# Patient Record
(demographics unavailable — no encounter records)

---

## 2024-10-09 NOTE — DATA REVIEWED
[FreeTextEntry1] : X-ray lumbar spine from R Mild to moderate lumbar scoliosis, convex to the left.  Multilevel degenerative disc disease as noted above.

## 2024-10-09 NOTE — PHYSICAL EXAM
[de-identified] : General: Appears well nourished and well developed, no acute distress Musculoskeletal: Gait is non-antalgic, patient is ambulating without assistance Lumbar Spine Exam:                         Inspection:    erythema (-)   ecchymosis (-)   rashes (-)                           Palpation: Palpation/percussion at the midline lumbar levels reveals no tenderness                        Strength Testin/5 in the bilateral lower extremities                      Reflexes: hypoactive in the bilateral lower extremities                      Sensation: Sensation to light touch grossly intact in the bilateral lower extremities except the bilateral feet                      Special Tests: None

## 2024-10-09 NOTE — PHYSICAL EXAM
[de-identified] : General: Appears well nourished and well developed, no acute distress Musculoskeletal: Gait is non-antalgic, patient is ambulating without assistance Lumbar Spine Exam:                         Inspection:    erythema (-)   ecchymosis (-)   rashes (-)                           Palpation: Palpation/percussion at the midline lumbar levels reveals no tenderness                        Strength Testin/5 in the bilateral lower extremities                      Reflexes: hypoactive in the bilateral lower extremities                      Sensation: Sensation to light touch grossly intact in the bilateral lower extremities except the bilateral feet                      Special Tests: None

## 2024-10-09 NOTE — DISCUSSION/SUMMARY
[de-identified] : ADAMA THACKER is a 72 year-old man presenting for a NPV for a history of

## 2024-10-09 NOTE — HISTORY OF PRESENT ILLNESS
[Lower back] : lower back [5] : 5 [Tingling] : tingling [Constant] : constant [Household chores] : household chores [Sleep] : sleep [Leisure] : leisure [Meds] : meds [] : no [FreeTextEntry1] : b/l feet  [FreeTextEntry6] : numbness and tingling in b/l feet  [FreeTextEntry9] : Meloxicam for knee pain  [de-identified] : lifting  [de-identified] : x-ray and MRI

## 2024-10-09 NOTE — ASSESSMENT
[FreeTextEntry1] : Patient presents predominantly with numbness/tingling in the bilateral feet, no pain, no radicular pain, no lower back pain.   A discussion regarding available pain management treatment options occurred with the patient.  These included  interventional, rehabilitative, pharmacological, and alternative modalities. We will proceed with the following:     Interventional treatment options:            - None indicated at present time              - see additional instructions below        Medication based treatment options:            - initiate trial of Gabapentin 300mg qhs. Rx sent today.  Additional treatment recommendations as follows:            - patient with follow up with neurologist, EMG ordered.          -  The risks, benefits and alternatives of medication were discussed with patient. Patient expressed understanding and would like to proceed.

## 2024-10-09 NOTE — HISTORY OF PRESENT ILLNESS
[Lower back] : lower back [5] : 5 [Tingling] : tingling [Constant] : constant [Household chores] : household chores [Sleep] : sleep [Leisure] : leisure [Meds] : meds [] : no [FreeTextEntry1] : b/l feet  [FreeTextEntry6] : numbness and tingling in b/l feet  [FreeTextEntry9] : Meloxicam for knee pain  [de-identified] : lifting  [de-identified] : x-ray and MRI

## 2024-10-09 NOTE — DISCUSSION/SUMMARY
[de-identified] : ADAMA THACKER is a 72 year-old man presenting for a NPV for a history of

## 2025-03-28 NOTE — IMAGING
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

## 2025-03-28 NOTE — DISCUSSION/SUMMARY
[de-identified] : I, Melly Perry, am scribing for Dr. Erazo in his presence for the chief complaint, physical exam, studies, assessment, and/or plan.

## 2025-03-28 NOTE — PHYSICAL EXAM
[Right] : right knee [] : no erythema [TWNoteComboBox7] : flexion 130 degrees [de-identified] : extension 0 degrees

## 2025-03-28 NOTE — ASSESSMENT
[FreeTextEntry1] : 72 year M WITH MODERATE B/L KNEE PAIN, R>L. HAD GREAT RELIEF FROM CSI 3/22/24. LAST GEL INJECTION 10/1/24 WITH MINIMAL RELIEF. PAIN WORSENS WITH STAIRS AND WALKING PROLONGED DISTANCES. PAIN IS AFFECTING ADL AND FUNCTIONAL ACTIVITIES. XRAYS REVIEWED WITH ADVANCED MEDIAL OA, OCD MEDIAL FEMORAL CONDYLE RT KNEE. TREATMENT OPTIONS REVIEWED. QUESTIONS ANSWERED. RT TKA DISCUSSED AT LENGTH.  RT KNEE MRI REVIEWED WITH OCD MEDIAL FEMORAL CONDYLE, LOOSE BODY POSTERIOR.  PMHx: COPD, HTN NO METAL ALLERGIES NO H/O DM NO H/O DVT/PE NO H/O MRSA/VRSA  RT TKA -   DISCUSSED R&B OF TKA. WILL ORDER CT TO EVAL FOR BONE LOSS AND DEFORMITY FOR PRE-OP PLANNING.   The patient was advised of the diagnosis. The natural history of the pathology was explained in full to the patient in layman's terms. All questions were answered. The risks and benefits of surgical and non-surgical treatment alternatives were explained in full to the patient.   We discussed my findings and the natural history of their condition. We talked about the details of the proposed surgery and the recovery. We discussed the material risks, possible benefits and alternatives to surgery. The risks include but are not limited to infection, bleeding and possible need for blood transfusion, fracture, bowel blockage, bladder retention or infection, need for reoperation, stiffness and/or limited range of motion, possible damage to nerves and blood vessels, failure of fixation of components, risk of deep vein thromboses and pulmonary embolism, wound healing problems. Although incredibly rare, we also discussed the risks of a cardiac event, stroke and even death during, or following, the surgery. We discussed the type of implants the patient will be receiving and the type of fixation that will be used, as well as whether a robot or computer navigation aide will be used. The patient understands they will need medical clearance and will attend a preoperative joint education class. We also discussed the type of anesthesia they will receive, and the risks associated with hospital or rehab length of stay, obesity, diabetes and smoking.   Patient Complains of pain in the affected joint with a level that often reaches greater than a 8/10. The pain has been progressively worsening throughout his/her treatment course. The pain has interfered with their ADLs and worsens with weight bearing. On exam they often have episodes of swelling/effusion with limited ROM. Pain worsens with ROM passive and active and I can palpate crepitus.   X- rays were reviewed with the patient and they show joint space narrowing, subchondral sclerosis, osteophyte formation, and subchondral cysts. After a period of more than 12 weeks physical therapy or exercise program done with me or another treating physician they have continued pain. The patient has failed a trial of NSAID medication or pain relievers if they were unable to tolerate NSAID medications as well as a series of injections, steroids, or hyaluronic acid. After a long discussion with the patient both the patient and I have decided we have exhausted all forms of less radical treatments and they would like to proceed with Total Joint Replacement.   Patient will plan to schedule surgery. Patient requires rolling walker and 3-in-1 commode S/P surgery. Patient currently as limited mobility that significantly impairs their ability to participate in one or more mobility related activities of daily living in the home. The patient is able to safely use the walker and the functional mobility deficit can be sufficiently resolved with the use of a walker. Patient will also be confined to one level of the home environment and there is no toilet on that level. Requires 3-in-1 commode for bedside use as patient cannot access bathroom safely in their home in timely manner. Will order rolling walker and 3-in-1 commode.

## 2025-03-28 NOTE — HISTORY OF PRESENT ILLNESS
[8] : 8 [de-identified] : 3.22.24 PT IS HERE FOR ELVIE KNEES, RIGHT IS WORSE THAN LEFT.  PAIN WORSE WITH WALKING AND BENDING, STAIRS.  RIGHT KNEE INJECTION LAST SUMMER WITH MIN SHORT TERM RELIEF  LEFT KNEE SCOPE WITH PARTIAL MENISECTOMY IN 2013 BUT NO OTHER TREATMENTS  9.17.24 PATIENT HERE FOR BILATERAL KNEE PAIN. RIGHT KNEE IS WORSE THAN LEFT. PATIENT STATES HE FEELS CLICKING. PATIENT IS REQUESTING TO START GEL INJECTIONS  9.24.24 PATIENT HERE FOR BILATERAL KNEE PAIN. EUFLEXXA #2  10.1.24 PATIENT HERE FOR BILATERAL KNEE PAIN. EUFLEXXA #3  3.28.25: PATIENT HERE TODAY FOR FOLLOW UP BILATERAL KNEE PAIN. SINCE, THE LAST VISIT, PAIN AND SYMPTOMS ARE WORSENING. STATES HE ONLY GOT A MONTH AND HALF OF PARTIAL RELIEF FROM THE GEL INJECTUIONS. R>L. STATES HE HAS TROUBLE WALKING, GOING UP AND DOWN STAIRS, AND RISING FROM A CHAIR SITTING TOO LONG.

## 2025-05-09 NOTE — PHYSICAL EXAM
[Right] : right knee [NL (0)] : extension 0 degrees [4___] : quadriceps 4[unfilled]/5 [] : ambulation with cane [FreeTextEntry9] : NO CALF TENDERNESS [TWNoteComboBox7] : flexion 100 degrees

## 2025-05-09 NOTE — HISTORY OF PRESENT ILLNESS
[5] : 5 [de-identified] : 3.22.24 PT IS HERE FOR ELVIE KNEES, RIGHT IS WORSE THAN LEFT.  PAIN WORSE WITH WALKING AND BENDING, STAIRS.  RIGHT KNEE INJECTION LAST SUMMER WITH MIN SHORT TERM RELIEF  LEFT KNEE SCOPE WITH PARTIAL MENISECTOMY IN 2013 BUT NO OTHER TREATMENTS  9.17.24 PATIENT HERE FOR BILATERAL KNEE PAIN. RIGHT KNEE IS WORSE THAN LEFT. PATIENT STATES HE FEELS CLICKING. PATIENT IS REQUESTING TO START GEL INJECTIONS  9.24.24 PATIENT HERE FOR BILATERAL KNEE PAIN. EUFLEXXA #2  10.1.24 PATIENT HERE FOR BILATERAL KNEE PAIN. EUFLEXXA #3  3.28.25: PATIENT HERE TODAY FOR FOLLOW UP BILATERAL KNEE PAIN. SINCE, THE LAST VISIT, PAIN AND SYMPTOMS ARE WORSENING. STATES HE ONLY GOT A MONTH AND HALF OF PARTIAL RELIEF FROM THE GEL INJECTUIONS. R>L. STATES HE HAS TROUBLE WALKING, GOING UP AND DOWN STAIRS, AND RISING FROM A CHAIR SITTING TOO LONG.   5.9.25 PATIENT HERE FOR RIGHT KNEE PAIN. DOS: 4.28.25. STATES LATERAL PAIN AND CLICKING FOR THE LAST TWO NIGHTS. USING CANE TO HELP AMBULATE.

## 2025-05-09 NOTE — ASSESSMENT
[FreeTextEntry1] : S/P R TKA DOING WELL. PRECAUTIONS AND ABX PPX REVIEWED. WILL START OUT PT THERAPY. ELEVATION [id number]  FU IN 4 WEEKS

## 2025-05-30 NOTE — HISTORY OF PRESENT ILLNESS
[2] : 2 [de-identified] : 3.22.24 PT IS HERE FOR ELVIE KNEES, RIGHT IS WORSE THAN LEFT.  PAIN WORSE WITH WALKING AND BENDING, STAIRS.  RIGHT KNEE INJECTION LAST SUMMER WITH MIN SHORT TERM RELIEF  LEFT KNEE SCOPE WITH PARTIAL MENISECTOMY IN 2013 BUT NO OTHER TREATMENTS  9.17.24 PATIENT HERE FOR BILATERAL KNEE PAIN. RIGHT KNEE IS WORSE THAN LEFT. PATIENT STATES HE FEELS CLICKING. PATIENT IS REQUESTING TO START GEL INJECTIONS  9.24.24 PATIENT HERE FOR BILATERAL KNEE PAIN. EUFLEXXA #2  10.1.24 PATIENT HERE FOR BILATERAL KNEE PAIN. EUFLEXXA #3  3.28.25: PATIENT HERE TODAY FOR FOLLOW UP BILATERAL KNEE PAIN. SINCE, THE LAST VISIT, PAIN AND SYMPTOMS ARE WORSENING. STATES HE ONLY GOT A MONTH AND HALF OF PARTIAL RELIEF FROM THE GEL INJECTUIONS. R>L. STATES HE HAS TROUBLE WALKING, GOING UP AND DOWN STAIRS, AND RISING FROM A CHAIR SITTING TOO LONG.   5.9.25 PATIENT HERE FOR RIGHT KNEE PAIN. DOS: 4.28.25. STATES LATERAL PAIN AND CLICKING FOR THE LAST TWO NIGHTS. USING CANE TO HELP AMBULATE.  05/30/25: PATIENT HERE FOR POST OP #2 RIGHT KNEE. DOS: 4/28/25  DOING WELL

## 2025-05-30 NOTE — ASSESSMENT
[FreeTextEntry1] : S/P R TKA DOING WELL. PRECAUTIONS AND ABX PPX REVIEWED. WILL START OUT PT THERAPY. ELEVATION [id number] PT  NO PASSIVE FLEXION FU IN 3 MONTHS WITH XRAYS

## 2025-05-30 NOTE — PHYSICAL EXAM
[Right] : right knee [NL (0)] : extension 0 degrees [5___] : hamstring 5[unfilled]/5 [] : patient ambulates without assistive device [FreeTextEntry9] : NO CALF TENDERNESS [TWNoteComboBox7] : flexion 130 degrees

## 2025-07-01 NOTE — HISTORY OF PRESENT ILLNESS
[FreeTextEntry1] :   PATIENT WAS NOTIFIED THAT ANYTHING WE DISCUSSED IN OUR MEETING TODAY MAY BE REFLECTED IN WRITING IN THE VISIT NOTE WHICH WILL BE AVAILABLE TO OTHER MEDICAL PROVIDERS TO REVIEW AS PART OF ROUTINE PATIENT CARE.  PATIENT VERBALLY AGREED.   72 year old male who presents for evaluation and treatment of obesity.   Bariatric surgery history: no  Obesity co-morbidities:  HTN, Knee OA b/l  Anti-obesity medications: no  Obesity medication side effects: PMH/PSH s/p RT TKA April 2025, prostate ca (surveillance f/u), s/p meniscus surgery  FH no thyroid ca SH quit smoking (2003), h/o ETOH dep, quit since 1 yr ago     SOCIAL/STRESSORS: Patient lives - w/ wife  Employment status - retired from [employer]   WEIGHT HISTORY: Lowest adult weight: 175 (1996- [employer]) Highest adult weight: 250 Current weight: 246  Height: 5 '11    Has gained/lost over the past year? GAINED- sedentary d/t knee OA, lung infections    Weight gain has occurred with: knee OA, lung infections recently    Past weight loss attempts include: GoLo- did work (been using over the past year)     SLEEP: sleeps/wakes: 10/ 0500 Uninterrupted? no, goes to [place]  +YOUNG MODERATE (22 AHI from sleep report in 2016)- uses CPAP    PHYSICAL ACTIVITY: Current physical activity: PT  2/week, limited d/t knee surgery recently      FOOD: B: skips L: 12, sandwich- cold cut w/ occ cheese, mensah, left overs,  D: 5-6, salad- greens, tomato, mushrooms, peppers, cabbage, +light dressing, OO or gilberto, or TO (wife not feeling well)- chinese (steamed chicken and vegg,light sauce), Carla (rice and beans, chicken)   snacks: at night  eating after dinner: chips, pretzels, cookies    Sodas/fast food/Take Out/Eat Out: no soda (occ coke zero), Gatorade, unsweetened ice tea, +occ FF- burger from [employer], TO 1-2/week, EO- 1/month   Water intake per day: sparkling water mixed w/ gatorade and ice     Intake form reviewed.